# Patient Record
Sex: FEMALE | Race: WHITE | NOT HISPANIC OR LATINO | URBAN - NONMETROPOLITAN AREA
[De-identification: names, ages, dates, MRNs, and addresses within clinical notes are randomized per-mention and may not be internally consistent; named-entity substitution may affect disease eponyms.]

---

## 2023-08-24 ENCOUNTER — APPOINTMENT (OUTPATIENT)
Dept: URBAN - NONMETROPOLITAN AREA CLINIC 45 | Age: 28
Setting detail: DERMATOLOGY
End: 2023-08-27

## 2023-08-24 DIAGNOSIS — Z12.83 ENCOUNTER FOR SCREENING FOR MALIGNANT NEOPLASM OF SKIN: ICD-10-CM

## 2023-08-24 DIAGNOSIS — L81.4 OTHER MELANIN HYPERPIGMENTATION: ICD-10-CM

## 2023-08-24 DIAGNOSIS — D22 MELANOCYTIC NEVI: ICD-10-CM

## 2023-08-24 DIAGNOSIS — D18.0 HEMANGIOMA: ICD-10-CM

## 2023-08-24 DIAGNOSIS — L90.5 SCAR CONDITIONS AND FIBROSIS OF SKIN: ICD-10-CM

## 2023-08-24 DIAGNOSIS — L82.0 INFLAMED SEBORRHEIC KERATOSIS: ICD-10-CM

## 2023-08-24 PROBLEM — D18.01 HEMANGIOMA OF SKIN AND SUBCUTANEOUS TISSUE: Status: ACTIVE | Noted: 2023-08-24

## 2023-08-24 PROBLEM — D22.5 MELANOCYTIC NEVI OF TRUNK: Status: ACTIVE | Noted: 2023-08-24

## 2023-08-24 PROCEDURE — OTHER PRESCRIPTION: OTHER

## 2023-08-24 PROCEDURE — OTHER MIPS QUALITY: OTHER

## 2023-08-24 PROCEDURE — OTHER PRESCRIPTION MEDICATION MANAGEMENT: OTHER

## 2023-08-24 PROCEDURE — 17110 DESTRUCT B9 LESION 1-14: CPT

## 2023-08-24 PROCEDURE — OTHER PUNCH EXCISION: OTHER

## 2023-08-24 PROCEDURE — OTHER LIQUID NITROGEN: OTHER

## 2023-08-24 PROCEDURE — 99203 OFFICE O/P NEW LOW 30 MIN: CPT | Mod: 25

## 2023-08-24 PROCEDURE — 11400 EXC TR-EXT B9+MARG 0.5 CM<: CPT | Mod: 59

## 2023-08-24 PROCEDURE — OTHER COUNSELING: OTHER

## 2023-08-24 RX ORDER — TRETIONIN 0.5 MG/G
APPLY CREAM TOPICAL QD
Qty: 45 | Refills: 5 | Status: ERX | COMMUNITY
Start: 2023-08-24

## 2023-08-24 ASSESSMENT — LOCATION SIMPLE DESCRIPTION DERM
LOCATION SIMPLE: UPPER BACK
LOCATION SIMPLE: LEFT CHEEK
LOCATION SIMPLE: ABDOMEN
LOCATION SIMPLE: LEFT FOREHEAD

## 2023-08-24 ASSESSMENT — LOCATION DETAILED DESCRIPTION DERM
LOCATION DETAILED: LEFT CENTRAL MALAR CHEEK
LOCATION DETAILED: LEFT INFERIOR CENTRAL MALAR CHEEK
LOCATION DETAILED: INFERIOR THORACIC SPINE
LOCATION DETAILED: EPIGASTRIC SKIN
LOCATION DETAILED: LEFT INFERIOR MEDIAL FOREHEAD
LOCATION DETAILED: RIGHT LATERAL ABDOMEN

## 2023-08-24 ASSESSMENT — LOCATION ZONE DERM
LOCATION ZONE: TRUNK
LOCATION ZONE: FACE

## 2023-08-24 NOTE — PROCEDURE: PUNCH EXCISION
Path Notes (To The Dermatopathologist): Please check margins.\\nRemoved a few years ago by another dermatologist, unsure of pathology

## 2023-08-24 NOTE — PROCEDURE: PRESCRIPTION MEDICATION MANAGEMENT
Render In Strict Bullet Format?: No
Detail Level: Zone
Plan: Tretinoin cream 0.05% apply a pea sized amount to face at night as tolerated\\nRecommend a chemical peel with  and to stop retinoid a week before & after tx.

## 2023-08-24 NOTE — PROCEDURE: MIPS QUALITY
Quality 111:Pneumonia Vaccination Status For Older Adults: Patient did not receive any pneumococcal conjugate or polysaccharide vaccine on or after their 60th birthday and before the end of the measurement period
Quality 226: Preventive Care And Screening: Tobacco Use: Screening And Cessation Intervention: Patient screened for tobacco use and is an ex/non-smoker
Quality 130: Documentation Of Current Medications In The Medical Record: Current Medications Documented
Quality 431: Preventive Care And Screening: Unhealthy Alcohol Use - Screening: Patient not identified as an unhealthy alcohol user when screened for unhealthy alcohol use using a systematic screening method
Detail Level: Detailed
Quality 110: Preventive Care And Screening: Influenza Immunization: Influenza Immunization previously received during influenza season

## 2023-08-24 NOTE — PROCEDURE: LIQUID NITROGEN
Post-Care Instructions: I reviewed with the patient in detail post-care instructions. Patient is to wear sunprotection, and avoid picking at any of the treated lesions. Pt may apply Vaseline to crusted or scabbing areas.
Show Spray Paint Technique Variable?: Yes
Render Note In Bullet Format When Appropriate: No
Number Of Freeze-Thaw Cycles: 1 freeze-thaw cycle
Detail Level: Detailed
Medical Necessity Information: It is in your best interest to select a reason for this procedure from the list below. All of these items fulfill various CMS LCD requirements except the new and changing color options.
Spray Paint Text: The liquid nitrogen was applied to the skin utilizing a spray paint frosting technique.
Consent: The patient's verbal consent was obtained including but not limited to risks of crusting, scabbing, blistering, scarring, darker or lighter pigmentary change, recurrence, incomplete removal and infection.
Medical Necessity Clause: This procedure was medically necessary because the lesions that were treated were:

## 2023-08-28 ENCOUNTER — APPOINTMENT (OUTPATIENT)
Dept: URBAN - NONMETROPOLITAN AREA CLINIC 45 | Age: 28
Setting detail: DERMATOLOGY
End: 2023-08-28

## 2023-08-28 DIAGNOSIS — Z41.9 ENCOUNTER FOR PROCEDURE FOR PURPOSES OTHER THAN REMEDYING HEALTH STATE, UNSPECIFIED: ICD-10-CM

## 2023-08-28 PROCEDURE — OTHER CHEMICAL PEEL: OTHER

## 2023-08-28 ASSESSMENT — LOCATION ZONE DERM: LOCATION ZONE: FACE

## 2023-08-28 ASSESSMENT — LOCATION SIMPLE DESCRIPTION DERM: LOCATION SIMPLE: RIGHT FOREHEAD

## 2023-08-28 ASSESSMENT — LOCATION DETAILED DESCRIPTION DERM: LOCATION DETAILED: RIGHT MEDIAL FOREHEAD

## 2023-08-28 NOTE — PROCEDURE: CHEMICAL PEEL
Consent: Prior to the procedure, verbal consent was obtained and risks were reviewed, including but not limited to: redness, peeling, blistering, pigmentary change, scarring, infection, and pain.

## 2023-09-05 ENCOUNTER — APPOINTMENT (OUTPATIENT)
Dept: URBAN - NONMETROPOLITAN AREA CLINIC 45 | Age: 28
Setting detail: DERMATOLOGY
End: 2023-09-06

## 2023-09-05 DIAGNOSIS — Z41.9 ENCOUNTER FOR PROCEDURE FOR PURPOSES OTHER THAN REMEDYING HEALTH STATE, UNSPECIFIED: ICD-10-CM

## 2023-09-05 PROCEDURE — OTHER FILLERS: OTHER

## 2023-09-07 ENCOUNTER — APPOINTMENT (OUTPATIENT)
Dept: URBAN - NONMETROPOLITAN AREA CLINIC 45 | Age: 28
Setting detail: DERMATOLOGY
End: 2023-09-07

## 2023-09-07 DIAGNOSIS — L82.0 INFLAMED SEBORRHEIC KERATOSIS: ICD-10-CM

## 2023-09-07 DIAGNOSIS — D22 MELANOCYTIC NEVI: ICD-10-CM

## 2023-09-07 PROBLEM — D22.5 MELANOCYTIC NEVI OF TRUNK: Status: ACTIVE | Noted: 2023-09-07

## 2023-09-07 PROCEDURE — OTHER BENIGN DESTRUCTION: OTHER

## 2023-09-07 PROCEDURE — OTHER ADDITIONAL NOTES: OTHER

## 2023-09-07 PROCEDURE — OTHER MIPS QUALITY: OTHER

## 2023-09-07 PROCEDURE — 17110 DESTRUCT B9 LESION 1-14: CPT

## 2023-09-07 PROCEDURE — OTHER SUTURE REMOVAL: OTHER

## 2023-09-07 ASSESSMENT — LOCATION ZONE DERM
LOCATION ZONE: FACE
LOCATION ZONE: TRUNK

## 2023-09-07 ASSESSMENT — LOCATION DETAILED DESCRIPTION DERM
LOCATION DETAILED: SUPERIOR MID FOREHEAD
LOCATION DETAILED: LEFT MEDIAL UPPER BACK

## 2023-09-07 ASSESSMENT — LOCATION SIMPLE DESCRIPTION DERM
LOCATION SIMPLE: SUPERIOR FOREHEAD
LOCATION SIMPLE: LEFT UPPER BACK

## 2023-09-07 NOTE — PROCEDURE: ADDITIONAL NOTES
Detail Level: Simple
Additional Notes: Pt with swelling of R upper lip and a few ecchymoses, post lip filler 2 days ago. Reassured pt this will resolve with time. Advised her to continue oral antihistamine daily; can double up also.
Render Risk Assessment In Note?: no

## 2023-09-07 NOTE — PROCEDURE: BENIGN DESTRUCTION
Consent: The patient's verbal consent was obtained including but not limited to risks of crusting, scabbing, blistering, scarring, darker or lighter pigmentary change, recurrence, incomplete removal and infection.
Include Z78.9 (Other Specified Conditions Influencing Health Status) As An Associated Diagnosis?: No
Medical Necessity Clause: This procedure was medically necessary because the lesions that were treated were:
Render Post-Care Instructions In Note?: yes
Medical Necessity Information: It is in your best interest to select a reason for this procedure from the list below. All of these items fulfill various CMS LCD requirements except the new and changing color options.
Post-Care Instructions: I reviewed with the patient in detail post-care instructions. Patient is to wear sunprotection, and avoid picking at any of the treated lesions. Pt may apply Vaseline to crusted or scabbing areas.
Anesthesia Volume In Cc: 0.5
Treatment Number (Will Not Render If 0): 1
Number Of Freeze-Thaw Cycles: 3 freeze-thaw cycles
Detail Level: Detailed

## 2023-09-25 ENCOUNTER — APPOINTMENT (OUTPATIENT)
Dept: URBAN - NONMETROPOLITAN AREA CLINIC 45 | Age: 28
Setting detail: DERMATOLOGY
End: 2023-09-25

## 2023-09-25 DIAGNOSIS — Z41.9 ENCOUNTER FOR PROCEDURE FOR PURPOSES OTHER THAN REMEDYING HEALTH STATE, UNSPECIFIED: ICD-10-CM

## 2023-09-25 PROCEDURE — OTHER CHEMICAL PEEL: OTHER

## 2023-09-25 ASSESSMENT — LOCATION DETAILED DESCRIPTION DERM: LOCATION DETAILED: INFERIOR MID FOREHEAD

## 2023-09-25 ASSESSMENT — LOCATION SIMPLE DESCRIPTION DERM: LOCATION SIMPLE: INFERIOR FOREHEAD

## 2023-09-25 ASSESSMENT — LOCATION ZONE DERM: LOCATION ZONE: FACE

## 2023-09-25 NOTE — PROCEDURE: CHEMICAL PEEL
Price (Use Numbers Only, No Special Characters Or $): 0
Lot Number (Optional): [id number]
Expiration Date (Optional): 06/2024
Detail Level: Zone
Number Of Layers: 6
Prep: The treated area was degreased with pre-peel cleanser, and vaseline was applied for protection of mucous membranes.
Post Peel Care: After the procedure, a post-peel cream was applied to the treated areas. Sun protection and post-care instructions were reviewed with the patient.
Consent: Prior to the procedure, verbal consent was obtained and risks were reviewed, including but not limited to: redness, peeling, blistering, pigmentary change, scarring, infection, and pain.
Post-Care Instructions: I reviewed with the patient in detail post-care instructions. Patient should avoid sun exposure and wear sun protection.
Chemical Peel: Vi
Comments: VI Peel Precision Plus